# Patient Record
Sex: FEMALE | Race: BLACK OR AFRICAN AMERICAN | NOT HISPANIC OR LATINO | URBAN - METROPOLITAN AREA
[De-identification: names, ages, dates, MRNs, and addresses within clinical notes are randomized per-mention and may not be internally consistent; named-entity substitution may affect disease eponyms.]

---

## 2021-10-28 NOTE — PATIENT DISCUSSION
Baseline Mac OCT obtained and reviewed 10/28/21. Monitor. Patient educated on visual expectation after cataract surgery. Recommend preservision AREDS2 eye vitamins.

## 2022-12-07 ENCOUNTER — NEW PATIENT (OUTPATIENT)
Dept: URBAN - METROPOLITAN AREA CLINIC 4 | Facility: CLINIC | Age: 62
End: 2022-12-07

## 2022-12-07 PROCEDURE — 92015 DETERMINE REFRACTIVE STATE: CPT

## 2022-12-07 PROCEDURE — 92004 COMPRE OPH EXAM NEW PT 1/>: CPT

## 2022-12-07 PROCEDURE — 92133 CPTRZD OPH DX IMG PST SGM ON: CPT

## 2022-12-07 ASSESSMENT — KERATOMETRY
OS_AXISANGLE_DEGREES: 3
OD_K1POWER_DIOPTERS: 42.50
OD_AXISANGLE_DEGREES: 159
OS_K1POWER_DIOPTERS: 42.25
OD_K2POWER_DIOPTERS: 43.00
OS_AXISANGLE2_DEGREES: 93
OS_K2POWER_DIOPTERS: 43.00
OD_AXISANGLE2_DEGREES: 69

## 2022-12-07 ASSESSMENT — TONOMETRY
OD_IOP_MMHG: 14
OS_IOP_MMHG: 14

## 2022-12-07 ASSESSMENT — VISUAL ACUITY
OS_SC: 20/30-1
OU_SC: 20/25
OS_GLARE: 20/50-1
OD_SC: 20/25
OD_GLARE: 20/50-1

## 2023-01-17 NOTE — PATIENT DISCUSSION
The types of intraocular lenses were reviewed with the patient along with a discussion of their various strengths and weaknesses. Elects to proceed with Basic OS  goal power.